# Patient Record
Sex: MALE | Race: WHITE | URBAN - METROPOLITAN AREA
[De-identification: names, ages, dates, MRNs, and addresses within clinical notes are randomized per-mention and may not be internally consistent; named-entity substitution may affect disease eponyms.]

---

## 2024-11-19 ENCOUNTER — APPOINTMENT (RX ONLY)
Dept: URBAN - METROPOLITAN AREA CLINIC 332 | Facility: CLINIC | Age: 19
Setting detail: DERMATOLOGY
End: 2024-11-19

## 2024-11-19 DIAGNOSIS — L40.0 PSORIASIS VULGARIS: ICD-10-CM

## 2024-11-19 PROCEDURE — ? PRESCRIPTION

## 2024-11-19 PROCEDURE — ? COUNSELING

## 2024-11-19 PROCEDURE — ? PRESCRIPTION MEDICATION MANAGEMENT

## 2024-11-19 PROCEDURE — 99204 OFFICE O/P NEW MOD 45 MIN: CPT

## 2024-11-19 PROCEDURE — ? TREATMENT GOALS

## 2024-11-19 PROCEDURE — ? ADDITIONAL NOTES

## 2024-11-19 RX ORDER — TAPINAROF 10 MG/1000MG
CREAM TOPICAL
Qty: 60 | Refills: 6 | Status: ERX | COMMUNITY
Start: 2024-11-19

## 2024-11-19 RX ADMIN — TAPINAROF 1: 10 CREAM TOPICAL at 00:00

## 2024-11-19 ASSESSMENT — ITCH NUMERIC RATING SCALE: HOW SEVERE IS YOUR ITCHING?: 6

## 2024-11-19 ASSESSMENT — BSA PSORIASIS: % BODY COVERED IN PSORIASIS: 10

## 2024-11-19 ASSESSMENT — LOCATION SIMPLE DESCRIPTION DERM
LOCATION SIMPLE: SCALP
LOCATION SIMPLE: POSTERIOR SCALP

## 2024-11-19 ASSESSMENT — LOCATION DETAILED DESCRIPTION DERM
LOCATION DETAILED: LEFT CENTRAL PARIETAL SCALP
LOCATION DETAILED: RIGHT CENTRAL PARIETAL SCALP
LOCATION DETAILED: POSTERIOR MID-PARIETAL SCALP
LOCATION DETAILED: MID-OCCIPITAL SCALP

## 2024-11-19 ASSESSMENT — LOCATION ZONE DERM: LOCATION ZONE: SCALP

## 2024-11-19 ASSESSMENT — PGA PSORIASIS: PGA PSORIASIS 2020: MILD

## 2024-11-19 NOTE — HPI: PSORIASIS (PATIENT REPORTED)
Do You Have A Family History Of Psoriasis?: yes
Have You Been Diagnosed With Psoriatic Arthritis?: no
Where Is Your Psoriasis Located?: Scalp
List Over The Counter Topical Steroids You Tried (Separate Each Name With A Comma):: Head and shoulders
Please List The Treatments That Have Worked Best For Your Psoriasis: (Separate Each Entry With A Comma): None

## 2024-11-19 NOTE — PROCEDURE: ADDITIONAL NOTES
Detail Level: Zone
Additional Notes: Patient advised to look out for joint pain
Render Risk Assessment In Note?: no

## 2024-11-19 NOTE — PROCEDURE: PRESCRIPTION MEDICATION MANAGEMENT
Samples Given: VTAMA
Initiate Treatment: Vtama 1 % topical cream \\nApply qhs (scalp)
Detail Level: Zone
Render In Strict Bullet Format?: No